# Patient Record
Sex: MALE | Race: BLACK OR AFRICAN AMERICAN
[De-identification: names, ages, dates, MRNs, and addresses within clinical notes are randomized per-mention and may not be internally consistent; named-entity substitution may affect disease eponyms.]

---

## 2019-07-22 ENCOUNTER — HOSPITAL ENCOUNTER (INPATIENT)
Dept: HOSPITAL 74 - YASAS | Age: 49
LOS: 4 days | Discharge: HOME | DRG: 773 | End: 2019-07-26
Attending: SURGERY | Admitting: SURGERY
Payer: COMMERCIAL

## 2019-07-22 VITALS — BODY MASS INDEX: 42.3 KG/M2

## 2019-07-22 DIAGNOSIS — M10.00: ICD-10-CM

## 2019-07-22 DIAGNOSIS — F10.230: Primary | ICD-10-CM

## 2019-07-22 DIAGNOSIS — F32.9: ICD-10-CM

## 2019-07-22 DIAGNOSIS — B35.3: ICD-10-CM

## 2019-07-22 DIAGNOSIS — F19.24: ICD-10-CM

## 2019-07-22 DIAGNOSIS — E11.9: ICD-10-CM

## 2019-07-22 DIAGNOSIS — F11.20: ICD-10-CM

## 2019-07-22 DIAGNOSIS — Z87.891: ICD-10-CM

## 2019-07-22 DIAGNOSIS — F14.20: ICD-10-CM

## 2019-07-22 DIAGNOSIS — Z79.84: ICD-10-CM

## 2019-07-22 DIAGNOSIS — F13.230: ICD-10-CM

## 2019-07-22 LAB
ALBUMIN SERPL-MCNC: 4.1 G/DL (ref 3.4–5)
ALP SERPL-CCNC: 88 U/L (ref 45–117)
ALT SERPL-CCNC: 38 U/L (ref 13–61)
ANION GAP SERPL CALC-SCNC: 4 MMOL/L (ref 8–16)
APPEARANCE UR: CLEAR
AST SERPL-CCNC: 30 U/L (ref 15–37)
BILIRUB SERPL-MCNC: 0.3 MG/DL (ref 0.2–1)
BILIRUB UR STRIP.AUTO-MCNC: NEGATIVE MG/DL
BUN SERPL-MCNC: 17.4 MG/DL (ref 7–18)
CALCIUM SERPL-MCNC: 8.7 MG/DL (ref 8.5–10.1)
CHLORIDE SERPL-SCNC: 100 MMOL/L (ref 98–107)
CO2 SERPL-SCNC: 33 MMOL/L (ref 21–32)
COLOR UR: YELLOW
CREAT SERPL-MCNC: 1 MG/DL (ref 0.55–1.3)
DEPRECATED RDW RBC AUTO: 15.9 % (ref 11.9–15.9)
GLUCOSE SERPL-MCNC: 85 MG/DL (ref 74–106)
HCT VFR BLD CALC: 37.1 % (ref 35.4–49)
HGB BLD-MCNC: 12.1 GM/DL (ref 11.7–16.9)
KETONES UR QL STRIP: NEGATIVE
LEUKOCYTE ESTERASE UR QL STRIP.AUTO: NEGATIVE
MCH RBC QN AUTO: 27.3 PG (ref 25.7–33.7)
MCHC RBC AUTO-ENTMCNC: 32.6 G/DL (ref 32–35.9)
MCV RBC: 83.9 FL (ref 80–96)
NITRITE UR QL STRIP: NEGATIVE
PH UR: 5.5 [PH] (ref 5–8)
PLATELET # BLD AUTO: 221 K/MM3 (ref 134–434)
PMV BLD: 8.6 FL (ref 7.5–11.1)
POTASSIUM SERPLBLD-SCNC: 4.4 MMOL/L (ref 3.5–5.1)
PROT SERPL-MCNC: 7.6 G/DL (ref 6.4–8.2)
PROT UR QL STRIP: NEGATIVE
PROT UR QL STRIP: NEGATIVE
RBC # BLD AUTO: 4.42 M/MM3 (ref 4–5.6)
SODIUM SERPL-SCNC: 138 MMOL/L (ref 136–145)
SP GR UR: 1.03 (ref 1.01–1.03)
UROBILINOGEN UR STRIP-MCNC: 1 MG/DL (ref 0.2–1)
WBC # BLD AUTO: 6.1 K/MM3 (ref 4–10)

## 2019-07-22 PROCEDURE — HZ2ZZZZ DETOXIFICATION SERVICES FOR SUBSTANCE ABUSE TREATMENT: ICD-10-PCS | Performed by: ALLERGY & IMMUNOLOGY

## 2019-07-22 RX ADMIN — ALUMINUM HYDROXIDE, MAGNESIUM HYDROXIDE, AND SIMETHICONE PRN ML: 200; 200; 20 SUSPENSION ORAL at 20:56

## 2019-07-22 RX ADMIN — Medication SCH MG: at 22:35

## 2019-07-22 NOTE — HP
CIWA Score


Nausea/Vomitin-No Nausea/No Vomiting


Muscle Tremors: 2


Anxiety: 0-No Anxiety, at Ease


Agitation: 0-Normal Activity


Paroxysmal Sweats: 1-Minimal Palms Moist


Orientation: 0-Oriented


Tacttile Disturbances: 0-None


Auditory Disturbances: 0-None


Visual Disturbances: 0-None


Headache: 0-None Present


CIWA-Ar Total Score: 3





- Admission Criteria


[facility] Guidelines: Admission for Medically Managed Detox: 


Requires at least one of the followin. CIWA greater than 12


2. Seizures within the past 24 hours


3. Delirium tremens within the past 24 hours


4. Hallucinations within the past 24 hours


5. Acute intervention needed for co  occurring medical disorder


6. Acute intervention needed for co  occurring psychiatric disorder


7. Severe withdrawal that cannot be handled at a lower level of care (continued


    vomiting, continued diarrhea, abnormal vital signs) requiring intravenous


    medication and/or fluids


8. Pregnancy








Admission [facility]





- [facility]


Chief Complaint: 


alcohol, cocaine, and benzo use





Allergies/Adverse Reactions: 


 Allergies











Allergy/AdvReac Type Severity Reaction Status Date / Time


 


tomato [Tomato] Allergy Mild Rash Verified 19 12:32


 


No Known Drug Allergies Allergy   Verified 19 12:32


 


NO RED SAUCE Allergy Mild  Uncoded 19 12:32











History of Present Illness: 


48 y/o/m here for alcohol, benzo, and cocaine use. He has been drinking 2 

6packs and a pint of vodka daily, last drink was yesterday. He starts to drinks 

in the afternoon and if he does not drink he starts to get the shakes and feel 

nauseous. He denies any history of seizures or blackouts, no recent 

hospitalizations for alcohol intoxication. He uses cocaine every other day, 5 

bags at a time which he uses by smoking, last use yesterday. He denies any IV 

use. He has been using Xanax which he buys off the streets for the last 6 

months. He uses 1 pill a day and is not sure if it is 1mg or 2mg, last use 

yesterday. He is in a Methadone program, [facility], and 

has been going there for 6 months. His current dose is 130mg and he received 

his dose today. He denies using tobacco products or any other illicit 

substances. He is currently living with family and is unemployed. PMHx of gout, 

he has taken Indomethacin for it in the past but ran out of his prescription 3 

months ago. He has had surgery on his right hand for scar tissue but denies any 

other surgeries.    








- Ebola screening


Have you traveled outside of the country in the last 21 days: No


Have you had contact with anyone from an Ebola affected area: No


Do you have a fever: No





- Review of Systems


Constitutional: No Symptoms Reported


EENT: reports: No Symptoms Reported


Respiratory: reports: No Symptoms reported


Cardiac: reports: Lightheadedness


GI: reports: No Symptoms Reported


: reports: No Symptoms Reported


Musculoskeletal: reports: No Symptoms Reported


Integumentary: reports: No Symptoms Reported


Neuro: reports: No Symptoms reported


Endocrine: reports: Excessive Sweating


Hematology: reports: No Symptoms Reported


Psychiatric: reports: No Sypmtoms Reported


Other Systems: Reviewed and Negative





Patient History





- Patient Medical History


Hx Anemia: No


Hx Asthma: No


Hx Chronic Obstructive Pulmonary Disease (COPD): No


Hx Cancer: No


Hx Cardiac Disorders: No


Hx Congestive Heart Failure: No


Hx Hypertension: No


Hx Hypercholesterolemia: No


Hx Pacemaker: No


HX Cerebrovascular Accident: No


Hx Seizures: No


Hx Dementia: No


Hx Diabetes: No


Hx Gastrointestinal Disorders: No


Hx Liver Disease: No


Hx Genitourinary Disorders: No


Hx Sexually Transmitted Disorders: No


Hx Renal Disease (ESRD): No


Hx Thyroid Disease: No


Hx Human Immunodeficiency Virus (HIV): No (negative)


Hx Hepatitis C: No (tested neg previously)


Hx Depression: No


Hx Suicide Attempt: No (DENIES)


Hx Bipolar Disorder: No


Hx Schizophrenia: No


Other Medical History: no suicidal or homicidal ideations





- Patient Surgical History


Past Surgical History: Yes


Hx Neurologic Surgery: No


Hx Cataract Extraction: No


Hx Cardiac Surgery: No


Hx Lung Surgery: No


Hx Breast Surgery: No


Hx Breast Biopsy: No


Hx Abdominal Surgery: No


Hx Appendectomy: No


Hx Cholecystectomy: No


Hx Genitourinary Surgery: No


Hx  Section: No


Hx Orthopedic Surgery: Yes (LEFT RING FINGER TENDON REPAIR 20 YRS AGO)


Other Surgical History: L ring finger tndon repair in 


Anesthesia Reaction: No (never given anaesthesia)





- PPD History


Previous Implant?: Yes


Documented Results: Negative w/o proof


PPD to be Administered?: Yes





- Smoking Cessation


Smoking history: Former smoker


Have you smoked in the past 12 months: No


Aproximately how many cigarettes per day: 0


Cigars Per Day: 0


Hx Chewing Tobacco Use: No


Initiated information on smoking cessation: No





- Substance & Tx. History


Hx Alcohol Use: Yes


Substance Use Type: Alcohol, Cocaine, Opiates





- Substances abused


  ** Alcohol


Substance route: Oral


Frequency: Daily


Amount used: 2 Sixpk & 1 pt of Liquor


Age of first use: 23


Date of last use: 19





  ** Alprazolam (Xanax)


Substance route: Oral


Frequency: Daily


Amount used: 2 sticks


Age of first use: 48


Date of last use: 19





  ** Crack


Substance route: Smoking


Frequency: 3-6 times per week


Amount used: $50


Age of first use: 25


Date of last use: 19





Family Disease History





- Family Disease History


Family Disease History: Diabetes: Grandparent (GM-), Mother, Other: 

Brother (heroin, stopped using)





Admission Physical Exam [facility]





- Vital Signs


Vital Signs: 


 Vital Signs - 24 hr











  19





  12:35


 


Temperature 97.1 F L


 


Pulse Rate 64


 


Respiratory 18





Rate 


 


Blood Pressure 122/79














- Physical


General Appearance: Yes: No Apparent Distress


HEENTM: Yes: EOMI, Normocephalic


Respiratory: Yes: Lungs Clear, Normal Breath Sounds, No Accessory Muscle Use


Neck: Yes: Supple


Cardiology: Yes: Regular Rhythm, Regular Rate, S1, S2


Abdominal: Yes: Normal Bowel Sounds, Non Tender


Musculoskeletal: Yes: Gait Steady


Extremities: Yes: Normal Capillary Refill, Tremors (mild), Swelling (2+ non 

pitting edema of both legs)


Neurological: Yes: Fully Oriented, Alert, Motor Strength 5/5


Integumentary: Yes: Dry





- Diagnostic


(1) Alcohol use disorder


Current Visit: Yes   Status: Acute   





(2) Cocaine use disorder


Current Visit: Yes   Status: Acute   





(3) Xanax use disorder, mild, abuse


Current Visit: Yes   Status: Acute   





(4) Methadone maintenance therapy patient


Current Visit: Yes   Status: Acute   





Cleared for Admission [facility]





- Detox or Rehab


[facility] Level of Care: Medically Managed


Detox Regimen/Protocol: Librium





Breathalyzer





- Breathalyzer


Breathalyzer: 0





Urine Drug Screen





- Test Device


Lot number: [id number]


Expiration date: 21





- Control


Is test valid?: Yes





- Results


Drug screen NEGATIVE: No


Urine drug screen results: DAREK-Cocaine, MTD-Methadone, BUP-Suboxone





Inpatient Rehab Admission





- Rehab Decision to Admit


Inpatient rehab admission?: No

## 2019-07-23 RX ADMIN — Medication SCH TAB: at 10:32

## 2019-07-23 RX ADMIN — METHADONE HYDROCHLORIDE SCH MG: 40 TABLET ORAL at 05:36

## 2019-07-23 RX ADMIN — ALUMINUM HYDROXIDE, MAGNESIUM HYDROXIDE, AND SIMETHICONE PRN ML: 200; 200; 20 SUSPENSION ORAL at 11:58

## 2019-07-23 RX ADMIN — IBUPROFEN PRN MG: 400 TABLET, FILM COATED ORAL at 10:32

## 2019-07-23 RX ADMIN — Medication SCH: at 23:22

## 2019-07-24 RX ADMIN — MAGNESIUM HYDROXIDE PRN ML: 400 SUSPENSION ORAL at 20:32

## 2019-07-24 RX ADMIN — Medication SCH TAB: at 10:27

## 2019-07-24 RX ADMIN — IBUPROFEN PRN MG: 400 TABLET, FILM COATED ORAL at 21:44

## 2019-07-24 RX ADMIN — Medication SCH MG: at 21:44

## 2019-07-24 RX ADMIN — ALUMINUM HYDROXIDE, MAGNESIUM HYDROXIDE, AND SIMETHICONE PRN ML: 200; 200; 20 SUSPENSION ORAL at 11:55

## 2019-07-24 RX ADMIN — METHADONE HYDROCHLORIDE SCH MG: 40 TABLET ORAL at 05:41

## 2019-07-24 NOTE — PN
[facility] CIWA





- CIWA Score


Nausea/Vomitin-No Nausea/No Vomiting


Muscle Tremors: 2


Anxiety: 2


Agitation: 2


Paroxysmal Sweats: 3


Orientation: 0-Oriented


Tacttile Disturbances: 0-None


Auditory Disturbances: 0-None


Visual Disturbances: 0-None


Headache: 0-None Present


CIWA-Ar Total Score: 9





[facility] Progress Note (SOAP)


Subjective: 





sweats


tired


interrupted sleep





Objective: 





19 10:54


 Vital Signs











Temperature  97.2 F L  19 09:19


 


Pulse Rate  67   19 09:19


 


Respiratory Rate  18   19 09:19


 


Blood Pressure  117/76   19 09:19


 


O2 Sat by Pulse Oximetry (%)      














 Laboratory Tests











  19





  14:42 15:00 15:10


 


WBC    6.1


 


RBC    4.42


 


Hgb    12.1


 


Hct    37.1


 


MCV    83.9


 


MCH    27.3


 


MCHC    32.6


 


RDW    15.9


 


Plt Count    221


 


MPV    8.6


 


Sodium   


 


Potassium   


 


Chloride   


 


Carbon Dioxide   


 


Anion Gap   


 


BUN   


 


Creatinine   


 


Est GFR (CKD-EPI)AfAm   


 


Est GFR (CKD-EPI)NonAf   


 


POC Glucometer  137  


 


Random Glucose   


 


Calcium   


 


Total Bilirubin   


 


AST   


 


ALT   


 


Alkaline Phosphatase   


 


Total Protein   


 


Albumin   


 


Urine Color   Yellow 


 


Urine Appearance   Clear 


 


Urine pH   5.5 


 


Ur Specific Gravity   1.029 


 


Urine Protein   Negative 


 


Urine Glucose (UA)   Negative 


 


Urine Ketones   Negative 


 


Urine Blood   Negative 


 


Urine Nitrite   Negative 


 


Urine Bilirubin   Negative 


 


Urine Urobilinogen   1.0 


 


Ur Leukocyte Esterase   Negative 


 


RPR Titer   














  19





  15:10 15:10


 


WBC  


 


RBC  


 


Hgb  


 


Hct  


 


MCV  


 


MCH  


 


MCHC  


 


RDW  


 


Plt Count  


 


MPV  


 


Sodium  138 


 


Potassium  4.4 


 


Chloride  100 


 


Carbon Dioxide  33 H 


 


Anion Gap  4 L 


 


BUN  17.4 


 


Creatinine  1.0 


 


Est GFR (CKD-EPI)AfAm  100.55 


 


Est GFR (CKD-EPI)NonAf  86.76 


 


POC Glucometer  


 


Random Glucose  85 


 


Calcium  8.7 


 


Total Bilirubin  0.3 


 


AST  30 


 


ALT  38 


 


Alkaline Phosphatase  88 


 


Total Protein  7.6 


 


Albumin  4.1 


 


Urine Color  


 


Urine Appearance  


 


Urine pH  


 


Ur Specific Gravity  


 


Urine Protein  


 


Urine Glucose (UA)  


 


Urine Ketones  


 


Urine Blood  


 


Urine Nitrite  


 


Urine Bilirubin  


 


Urine Urobilinogen  


 


Ur Leukocyte Esterase  


 


RPR Titer   Nonreactive





aaox3


ambulating


no acute distress


Assessment: 





19 11:24


withdrawal sx


Plan: 





continue detox


increase fluids

## 2019-07-24 NOTE — EKG
Test Reason : 

Blood Pressure : ***/*** mmHG

Vent. Rate : 062 BPM     Atrial Rate : 062 BPM

   P-R Int : 190 ms          QRS Dur : 088 ms

    QT Int : 400 ms       P-R-T Axes : 061 048 034 degrees

   QTc Int : 406 ms

 

NORMAL SINUS RHYTHM

NORMAL ECG

NO PREVIOUS ECGS AVAILABLE

Confirmed by MARZENA CHOU MD ([id number]) on 7/24/2019 3:00:59 PM

 

Referred By: CONRAD CHRISTENSEN           Confirmed By:MARZENA CHOU MD

## 2019-07-25 RX ADMIN — IBUPROFEN PRN MG: 400 TABLET, FILM COATED ORAL at 21:41

## 2019-07-25 RX ADMIN — MAGNESIUM HYDROXIDE PRN ML: 400 SUSPENSION ORAL at 12:16

## 2019-07-25 RX ADMIN — IBUPROFEN PRN MG: 400 TABLET, FILM COATED ORAL at 10:47

## 2019-07-25 RX ADMIN — Medication SCH MG: at 21:37

## 2019-07-25 RX ADMIN — METHADONE HYDROCHLORIDE SCH MG: 40 TABLET ORAL at 06:13

## 2019-07-25 RX ADMIN — Medication SCH TAB: at 10:46

## 2019-07-25 NOTE — PN
S CIWA





- CIWA Score


Nausea/Vomitin-No Nausea/No Vomiting


Muscle Tremors: 1-None Visible, but Felt


Anxiety: 1-Mildly Anxious


Agitation: 1-Slight > Activity


Paroxysmal Sweats: No Perspiration


Orientation: 0-Oriented


Tacttile Disturbances: 0-None


Auditory Disturbances: 0-None


Visual Disturbances: 0-None


Headache: 0-None Present


CIWA-Ar Total Score: 3





[facility] Progress Note (SOAP)


Subjective: 





little anxiety


i need to leave early tomorrow by 7am. I am going to my out patient program


Objective: 





19 11:31


 Vital Signs











Temperature  97.3 F L  19 09:28


 


Pulse Rate  57 L  19 09:28


 


Respiratory Rate  17   19 09:28


 


Blood Pressure  114/69   19 09:28


 


O2 Sat by Pulse Oximetry (%)      








aaox3


ambulating


no acute distress


Assessment: 





19 11:31


mild withdrawal sx


Plan: 





continue detox


increase fluids


d/c at 7am as per pt request

## 2019-07-25 NOTE — PN
S CIWA





- CIWA Score


Nausea/Vomitin-No Nausea/No Vomiting


Muscle Tremors: 2


Anxiety: 1-Mildly Anxious


Agitation: 2


Paroxysmal Sweats: No Perspiration


Orientation: 0-Oriented


Tacttile Disturbances: 0-None


Auditory Disturbances: 0-None


Visual Disturbances: 0-None


Headache: 0-None Present


CIWA-Ar Total Score: 5





[facility] Progress Note (SOAP)


Subjective: 





little anxiety


Objective: 





19 08:58


 Vital Signs











Temperature  97.3 F L  19 07:11


 


Pulse Rate  66   19 07:11


 


Respiratory Rate  18   19 07:11


 


Blood Pressure  142/83   19 07:11


 


O2 Sat by Pulse Oximetry (%)      





aaox3


ambulating


no acute distress


Assessment: 





19 08:59


mild withdrawal sx


Plan: 





continue detox


increase fluids


d/c at 7am as per pt request

## 2019-07-26 VITALS — SYSTOLIC BLOOD PRESSURE: 134 MMHG | HEART RATE: 61 BPM | DIASTOLIC BLOOD PRESSURE: 85 MMHG | TEMPERATURE: 98 F

## 2019-07-26 RX ADMIN — METHADONE HYDROCHLORIDE SCH MG: 40 TABLET ORAL at 05:51

## 2019-07-26 NOTE — DS
[facility] Detox Discharge Summary


Admission Date: 


07/22/19





Discharge Date: 07/26/19





- History


Present History: Alcohol Dependence, Cocaine Dependence, Opioid Dependence, 

Sedative Dependence





- Physical Exam Results


Vital Signs: 


 Vital Signs











Temperature  98 F   07/26/19 06:14


 


Pulse Rate  61   07/26/19 06:14


 


Respiratory Rate  18   07/26/19 06:14


 


Blood Pressure  134/85   07/26/19 06:14


 


O2 Sat by Pulse Oximetry (%)      











Pertinent Admission Physical Exam Findings: 





pt arrived in withdrawals


 Laboratory Tests











  07/22/19 07/22/19 07/22/19





  14:42 15:00 15:00


 


WBC   


 


RBC   


 


Hgb   


 


Hct   


 


MCV   


 


MCH   


 


MCHC   


 


RDW   


 


Plt Count   


 


MPV   


 


Sodium   


 


Potassium   


 


Chloride   


 


Carbon Dioxide   


 


Anion Gap   


 


BUN   


 


Creatinine   


 


Est GFR (CKD-EPI)AfAm   


 


Est GFR (CKD-EPI)NonAf   


 


POC Glucometer  137  


 


Random Glucose   


 


Calcium   


 


Total Bilirubin   


 


AST   


 


ALT   


 


Alkaline Phosphatase   


 


Total Protein   


 


Albumin   


 


Urine Color    Yellow


 


Urine Appearance    Clear


 


Urine pH    5.5


 


Ur Specific Gravity    1.029


 


Urine Protein    Negative


 


Urine Glucose (UA)    Negative


 


Urine Ketones    Negative


 


Urine Blood    Negative


 


Urine Nitrite    Negative


 


Urine Bilirubin    Negative


 


Urine Urobilinogen    1.0


 


Ur Leukocyte Esterase    Negative


 


RPR Titer   


 


TB (QFT) Incubation    


 


TB Test (QFT) Nil   0.07 


 


TB Test (QFT) Mitogen   >10.00 


 


TB Test (QFT) Antigen   0.03 


 


TB Test (QFT)   Negative 


 


TB Positive Criteria    














  07/22/19 07/22/19 07/22/19





  15:10 15:10 15:10


 


WBC  6.1  


 


RBC  4.42  


 


Hgb  12.1  


 


Hct  37.1  


 


MCV  83.9  


 


MCH  27.3  


 


MCHC  32.6  


 


RDW  15.9  


 


Plt Count  221  


 


MPV  8.6  


 


Sodium   138 


 


Potassium   4.4 


 


Chloride   100 


 


Carbon Dioxide   33 H 


 


Anion Gap   4 L 


 


BUN   17.4 


 


Creatinine   1.0 


 


Est GFR (CKD-EPI)AfAm   100.55 


 


Est GFR (CKD-EPI)NonAf   86.76 


 


POC Glucometer   


 


Random Glucose   85 


 


Calcium   8.7 


 


Total Bilirubin   0.3 


 


AST   30 


 


ALT   38 


 


Alkaline Phosphatase   88 


 


Total Protein   7.6 


 


Albumin   4.1 


 


Urine Color   


 


Urine Appearance   


 


Urine pH   


 


Ur Specific Gravity   


 


Urine Protein   


 


Urine Glucose (UA)   


 


Urine Ketones   


 


Urine Blood   


 


Urine Nitrite   


 


Urine Bilirubin   


 


Urine Urobilinogen   


 


Ur Leukocyte Esterase   


 


RPR Titer    Nonreactive


 


TB (QFT) Incubation   


 


TB Test (QFT) Nil   


 


TB Test (QFT) Mitogen   


 


TB Test (QFT) Antigen   


 


TB Test (QFT)   


 


TB Positive Criteria   














  07/23/19 07/25/19





  16:40 06:15


 


WBC  


 


RBC  


 


Hgb  


 


Hct  


 


MCV  


 


MCH  


 


MCHC  


 


RDW  


 


Plt Count  


 


MPV  


 


Sodium  


 


Potassium  


 


Chloride  


 


Carbon Dioxide  


 


Anion Gap  


 


BUN  


 


Creatinine  


 


Est GFR (CKD-EPI)AfAm  


 


Est GFR (CKD-EPI)NonAf  


 


POC Glucometer  91  104


 


Random Glucose  


 


Calcium  


 


Total Bilirubin  


 


AST  


 


ALT  


 


Alkaline Phosphatase  


 


Total Protein  


 


Albumin  


 


Urine Color  


 


Urine Appearance  


 


Urine pH  


 


Ur Specific Gravity  


 


Urine Protein  


 


Urine Glucose (UA)  


 


Urine Ketones  


 


Urine Blood  


 


Urine Nitrite  


 


Urine Bilirubin  


 


Urine Urobilinogen  


 


Ur Leukocyte Esterase  


 


RPR Titer  


 


TB (QFT) Incubation  


 


TB Test (QFT) Nil  


 


TB Test (QFT) Mitogen  


 


TB Test (QFT) Antigen  


 


TB Test (QFT)  


 


TB Positive Criteria  








today pt is aaox3


ambulating


no acute distress


no s/s of withdrawals





- Treatment


Hospital Course: Detox Protocol Followed, Detoxed Safely, Responded well, 

Discharged Condition Good, Rehab Referral Accepted


Patient has Accepted a Rehab Referral to: pt declined rehab





- Medication


Discharge Medications: 


Ambulatory Orders





Indomethacin 50 mg PO TID #30 capsule 05/29/15 


metFORMIN HCL [Glucophage -] 500 mg PO BID #60 tablet 05/29/15 


Methadone [Dolophine -] 130 mg PO DAILY 07/22/19 











- Diagnosis


(1) Alcohol dependence with uncomplicated withdrawal


Status: Chronic   





(2) Cocaine dependence with withdrawal


Status: Chronic   





(3) Depression


Status: Acute   





(4) Drug-induced mood disorder


Status: Acute   





(5) Methadone maintenance therapy patient


Status: Chronic   





(6) Opioid dependence with withdrawal


Status: Chronic   





(7) Sedative, hypnotic or anxiolytic dependence with withdrawal, uncomplicated


Status: Chronic   





(8) Tinea pedis


Status: Chronic   


Qualifiers: 


   Laterality: unspecified laterality   Qualified Code(s): B35.3 - Tinea pedis 

  





(9) Xanax use disorder, mild, abuse


Status: Chronic   





(10) DM Diabetes mellitus type 2


Status: Chronic   





(11) Gouty arthritis


Status: Chronic   





- AMA


Did Patient Leave Against Medical Advice: No (declined rehab; referral provided)